# Patient Record
Sex: FEMALE | Race: ASIAN | URBAN - METROPOLITAN AREA
[De-identification: names, ages, dates, MRNs, and addresses within clinical notes are randomized per-mention and may not be internally consistent; named-entity substitution may affect disease eponyms.]

---

## 2017-05-02 ENCOUNTER — HISTORICAL (OUTPATIENT)
Dept: ADMINISTRATIVE | Facility: HOSPITAL | Age: 47
End: 2017-05-02

## 2017-05-02 LAB
CHOLEST SERPL-MCNC: 196 MG/DL
CHOLEST/HDLC SERPL: 3 {RATIO} (ref 0–4.4)
EST. AVERAGE GLUCOSE BLD GHB EST-MCNC: 103 MG/DL
HBA1C MFR BLD: 5.2 % (ref 4.7–5.6)
HDLC SERPL-MCNC: 66 MG/DL
LDLC SERPL CALC-MCNC: 109 MG/DL (ref 0–130)
TRIGL SERPL-MCNC: 103 MG/DL
VLDLC SERPL CALC-MCNC: 21 MG/DL

## 2018-12-17 ENCOUNTER — HISTORICAL (OUTPATIENT)
Dept: ADMINISTRATIVE | Facility: HOSPITAL | Age: 48
End: 2018-12-17

## 2018-12-17 LAB
ABS NEUT (OLG): 4 X10(3)/MCL (ref 2.1–9.2)
ALBUMIN SERPL-MCNC: 3.5 GM/DL (ref 3.4–5)
ALBUMIN/GLOB SERPL: 1 RATIO (ref 1–2)
ALP SERPL-CCNC: 61 UNIT/L (ref 45–117)
ALT SERPL-CCNC: 18 UNIT/L (ref 12–78)
APPEARANCE, UA: CLEAR
AST SERPL-CCNC: 11 UNIT/L (ref 15–37)
BACTERIA #/AREA URNS AUTO: ABNORMAL /[HPF]
BASOPHILS # BLD AUTO: 0.02 X10(3)/MCL
BASOPHILS NFR BLD AUTO: 0 %
BILIRUB SERPL-MCNC: 0.3 MG/DL (ref 0.2–1)
BILIRUB UR QL STRIP: NEGATIVE
BILIRUBIN DIRECT+TOT PNL SERPL-MCNC: <0.1 MG/DL
BILIRUBIN DIRECT+TOT PNL SERPL-MCNC: ABNORMAL MG/DL
BUN SERPL-MCNC: 15 MG/DL (ref 7–18)
CALCIUM SERPL-MCNC: 8.2 MG/DL (ref 8.5–10.1)
CHLORIDE SERPL-SCNC: 108 MMOL/L (ref 98–107)
CHOLEST SERPL-MCNC: 173 MG/DL
CHOLEST/HDLC SERPL: 3.1 {RATIO} (ref 0–4.4)
CO2 SERPL-SCNC: 28 MMOL/L (ref 21–32)
COLOR UR: YELLOW
CREAT SERPL-MCNC: 0.7 MG/DL (ref 0.6–1.3)
EOSINOPHIL # BLD AUTO: 0.2 10*3/UL
EOSINOPHIL NFR BLD AUTO: 3 %
ERYTHROCYTE [DISTWIDTH] IN BLOOD BY AUTOMATED COUNT: 12.4 % (ref 11.5–14.5)
EST. AVERAGE GLUCOSE BLD GHB EST-MCNC: 105 MG/DL
GLOBULIN SER-MCNC: 4 GM/ML (ref 2.3–3.5)
GLUCOSE (UA): NORMAL
GLUCOSE SERPL-MCNC: 82 MG/DL (ref 74–106)
HBA1C MFR BLD: 5.3 % (ref 4.2–6.3)
HCT VFR BLD AUTO: 39.1 % (ref 35–46)
HDLC SERPL-MCNC: 56 MG/DL
HGB BLD-MCNC: 12.7 GM/DL (ref 12–16)
HGB UR QL STRIP: >1 MG/DL
HYALINE CASTS #/AREA URNS LPF: ABNORMAL /[LPF]
IMM GRANULOCYTES # BLD AUTO: 0.01 10*3/UL
IMM GRANULOCYTES NFR BLD AUTO: 0 %
KETONES UR QL STRIP: NEGATIVE
LDLC SERPL CALC-MCNC: 97 MG/DL (ref 0–130)
LEUKOCYTE ESTERASE UR QL STRIP: 75 LEU/UL
LYMPHOCYTES # BLD AUTO: 1.18 X10(3)/MCL
LYMPHOCYTES NFR BLD AUTO: 20 % (ref 13–40)
MCH RBC QN AUTO: 29.7 PG (ref 26–34)
MCHC RBC AUTO-ENTMCNC: 32.5 GM/DL (ref 31–37)
MCV RBC AUTO: 91.4 FL (ref 80–100)
MONOCYTES # BLD AUTO: 0.48 X10(3)/MCL
MONOCYTES NFR BLD AUTO: 8 % (ref 4–12)
NEUTROPHILS # BLD AUTO: 4 X10(3)/MCL
NEUTROPHILS NFR BLD AUTO: 68 X10(3)/MCL
NITRITE UR QL STRIP: NEGATIVE
PH UR STRIP: 6 [PH] (ref 4.5–8)
PLATELET # BLD AUTO: 257 X10(3)/MCL (ref 130–400)
PMV BLD AUTO: 11.2 FL (ref 7.4–10.4)
POTASSIUM SERPL-SCNC: 3.8 MMOL/L (ref 3.5–5.1)
PROT SERPL-MCNC: 7.5 GM/DL (ref 6.4–8.2)
PROT UR QL STRIP: 20 MG/DL
RBC # BLD AUTO: 4.28 X10(6)/MCL (ref 4–5.2)
RBC #/AREA URNS AUTO: >=100 /[HPF]
SODIUM SERPL-SCNC: 142 MMOL/L (ref 136–145)
SP GR UR STRIP: 1.02 (ref 1–1.03)
SQUAMOUS #/AREA URNS LPF: ABNORMAL /[LPF]
TRIGL SERPL-MCNC: 99 MG/DL
UROBILINOGEN UR STRIP-ACNC: NORMAL
VLDLC SERPL CALC-MCNC: 20 MG/DL
WBC # SPEC AUTO: 5.9 X10(3)/MCL (ref 4.5–11)
WBC #/AREA URNS AUTO: ABNORMAL /HPF

## 2019-03-18 ENCOUNTER — HISTORICAL (OUTPATIENT)
Dept: ADMINISTRATIVE | Facility: HOSPITAL | Age: 49
End: 2019-03-18

## 2019-03-18 LAB
HBV SURFACE AG SERPL QL IA: NEGATIVE
HCV AB SERPL QL IA: NONREACTIVE
HIV 1+2 AB+HIV1 P24 AG SERPL QL IA: NONREACTIVE
T PALLIDUM AB SER QL: NONREACTIVE

## 2022-04-10 ENCOUNTER — HISTORICAL (OUTPATIENT)
Dept: ADMINISTRATIVE | Facility: HOSPITAL | Age: 52
End: 2022-04-10

## 2022-04-29 VITALS
BODY MASS INDEX: 21.91 KG/M2 | HEIGHT: 63 IN | DIASTOLIC BLOOD PRESSURE: 83 MMHG | SYSTOLIC BLOOD PRESSURE: 123 MMHG | WEIGHT: 123.69 LBS

## 2022-05-02 NOTE — HISTORICAL [facility] CERNER
This is a historical note converted from [employer]. Formatting and pictures may have been removed.  Please reference [employer] for original formatting and attached multimedia. Chief Complaint  annual  History of Present Illness  Pt is  here for annual gyn exam. Denies hx of abnormal pap. Reports last pap was . LMP 19. States has monthly periods lasting 3 days. Denies abnormal bleeding/discharge. Denies abd/pelvic pain. Pt is sexually active and uses condoms for contraception. She is not interested in alternative methods at this time. Denies hx of STIs but is interested in screening. Followed in med clinic for primary care. Denies any chronic medical conditions.  Review of Systems  Negative except HPI  Physical Exam  Vitals & Measurements  T:?98.5? ?F (Oral)? HR:?88(Peripheral)? RR:?18? BP:?123/83?  HT:?159?cm? WT:?56.1?kg? BMI:?22.19?  General: Alert and oriented, No acute distress.  Breast: No mass, No tenderness, No discharge.  Gastrointestinal: Soft, Non-tender.  Gynecology:  Labia: Bilateral, Majora, Minora, Within normal limits.  Vagina: Within normal limits.  Cervix: No cervical motion tenderness, No lesions.  Uterus: Mobile, Not tender.  Ovaries: Not tender, No mass.  Integumentary: Warm, Dry.  Neurologic: Alert, Oriented.  Psychiatric: Cooperative, Appropriate mood & affect.  Assessment/Plan  1.?Pap smear for cervical cancer screening?Z12.4  Ordered:  Clinic Follow up, *Est. 20 3:00:00 CDT, 1 Year, Order for future visit, Pap smear for cervical cancer screening, [facility]  Pathology Gyn Request, 19 15:25:00 CDT, AP Specimen, Thin Prep Pap Cervical-Auto/man screen, Routine GYN/Pap, Cervical, Thin Prep with HPV Probe, Normal, 19, Previous Pap, 2016, Other, Previous Pregnancy, Cervix Present, Routine, Collected, RT - Routine, Pap sme...  Preventative Health Care Est 40-64 years [id number] PC, Pap smear for cervical cancer screening  Routine screening for STI (sexually transmitted  infection), [facility], 03/18/19 15:25:00 CDT  ?  2.?Routine screening for STI (sexually transmitted infection)?Z11.3  Ordered:  Chlamydia trach and N. gonorrhea PCR, Routine collect, Cervical, Order for future visit, 03/18/19 15:25:00 CDT, Stop date 03/18/19 15:25:00 CDT, Nurse collect, Routine screening for STI (sexually transmitted infection)  Hepatitis B Surface Antigen, Routine collect, 03/18/19 15:25:00 CDT, Blood, Order for future visit, Stop date 03/18/19 15:25:00 CDT, Lab Collect, Routine screening for STI (sexually transmitted infection), 03/18/19 15:25:00 CDT  Hepatitis C Antibody, Routine collect, 03/18/19 15:25:00 CDT, Blood, Order for future visit, Stop date 03/18/19 15:25:00 CDT, Lab Collect, Routine screening for STI (sexually transmitted infection), 03/18/19 15:25:00 CDT  HIV 1 and 2, Now collect, 03/18/19 15:25:00 CDT, Blood, Order for future visit, Stop date 03/18/19 15:25:00 CDT, Lab Collect, Routine screening for STI (sexually transmitted infection), 03/18/19 15:25:00 CDT  Preventative Health Care Est 40-64 years [id number] PC, Pap smear for cervical cancer screening  Routine screening for STI (sexually transmitted infection), [facility], 03/18/19 15:25:00 CDT  Syphilis Antibody (with Reflex RPR), Routine collect, 03/18/19 15:25:00 CDT, Blood, Order for future visit, Stop date 03/18/19 15:25:00 CDT, Lab Collect, Routine screening for STI (sexually transmitted infection), 03/18/19 15:25:00 CDT  Wet Prep Smear, Routine collect, Vaginal, Order for future visit, 03/18/19 15:25:00 CDT, Stop date 03/18/19 15:25:00 CDT, Nurse collect, Routine screening for STI (sexually transmitted infection), 03/18/19 15:25:00 CDT  ?   Problem List/Past Medical History  Ongoing  Allergic rhinitis  Procedure/Surgical History  denies   Medications  Biofreeze 3.5% topical gel, 1 bandar, TOP, Daily, PRN  cetirizine 10 mg oral tablet, 10 mg= 1 tab(s), Oral, Daily, 5 refills  Mobic 7.5 mg oral tablet, 7.5 mg= 1 tab(s),  Oral, Daily  Allergies  No Known Medication Allergies  Social History  Alcohol  Never, 10/07/2015  Employment/School  Unemployed, 05/02/2017  Home/Environment  Lives with Children. Alcohol abuse in household: No. Substance abuse in household: No., 05/02/2017  Nutrition/Health  Regular, 05/02/2017  Sexual  Sexually active: Yes. Sexual orientation: Straight or heterosexual. Gender Identity Identifies as female., 03/18/2019  Substance Abuse  Never, 10/07/2015  Tobacco  Never (less than 100 in lifetime), N/A, 03/18/2019  Never smoker, N/A, 12/17/2018  Family History  Diabetes mellitus type 2: Mother.  Immunizations  Vaccine Date Status   influenza virus vaccine, inactivated 12/17/2018 Given

## 2022-05-02 NOTE — HISTORICAL [facility] CERNER
This is a historical note converted from [employer]. Formatting and pictures may have been removed.  Please reference [employer] for original formatting and attached multimedia. Chief Complaint  f/u, c/o back pain x3 days off/on, needs refills  History of Present Illness  48-year-old  female with no past medical history except for allergic rhinitis, is here for routine follow-up.? Patient was last seen by me in December of last year.? Shes been taking Flonase and Zyrtec which she reports has improved her symptoms.? Patient has son in the room who is translating for her as she does not speaking much.? They have refused a .? Son mentions that they are planning to move to [place] in the summer of next year.? And that they would like to get some lab work done its as its been a very long time.? She denies any complaints except for some low back pain that started about 3 days ago that happens mainly when she is resting.? She denies any trauma, or any falls.  Review of Systems  14 point ROS done, negative except for above.  Physical Exam  Vitals & Measurements  T:?36.6? ?C (Oral)? HR:?73(Peripheral)? RR:?16? BP:?103/67?  HT:?159?cm? HT:?159?cm? WT:?53.3?kg? WT:?53.3?kg? BMI:?21.08?  General_appears own age, in no acute distress  Integumentary_ normal capillary refill, normal texture/turgor/moisture  Eye_PERRLA, no icterus, no conjunctival injection  HENT_normocephalic, atraumatic, normal hearing bilateral  Respiratory_clear to auscultation, no wheezes/rales/rhonchi  Cardiovascular_RRR, no murmurs/rubs/gallops, normal distal pulses  Gastrointestinal_normal bowel sounds, abdomen soft/non-tender  Musculoskeletal_normal ROM, bilateral strength equal  Assessment/Plan  1. Allergic Rhinitis  symptoms well controlled with Flonase and zyrtec  will refill meds  no sinus headaches  ?   2. low back pain  acute, likely work related  biofreeze and mobic  ?  ?   RTC in 4 months  labs today  flu today  mammo ordered   Problem  List/Past Medical History  Ongoing  Allergic rhinitis  Historical  No qualifying data  Procedure/Surgical History  denies   Medications  Biofreeze 3.5% topical gel, 1 bandar, TOP, Daily, PRN  cetirizine 10 mg oral tablet, 10 mg= 1 tab(s), Oral, Daily, 5 refills  Mobic 7.5 mg oral tablet, 7.5 mg= 1 tab(s), Oral, Daily  Allergies  No Known Medication Allergies  Social History  Alcohol  Never, 10/07/2015  Employment/School  Unemployed, 05/02/2017  Home/Environment  Lives with Children. Alcohol abuse in household: No. Substance abuse in household: No., 05/02/2017  Nutrition/Health  Regular, 05/02/2017  Substance Abuse  Never, 10/07/2015  Tobacco  Never smoker, N/A, 12/17/2018  Family History  Family history is unknown  Immunizations  Vaccine Date Status   influenza virus vaccine, inactivated 12/17/2018 Given   Health Maintenance  Health Maintenance  ???Pending?(in the next year)  ??? ??OverDue  ??? ? ? ?Diabetes Screening due??and every?  ??? ? ? ?Influenza Vaccine due??and every?  ??? ? ? ?Cervical Cancer Screening due??11/14/15??and every 3??year(s)  ??? ??Due?  ??? ? ? ?ADL Screening due??12/17/18??and every 1??year(s)  ??? ? ? ?Tetanus Vaccine due??12/17/18??and every 10??year(s)  ??? ??Due In Future?  ??? ? ? ?Alcohol Misuse Screening not due until??12/20/18??and every 1??year(s)  ???Satisfied?(in the past 1 year)  ??? ??Satisfied?  ??? ? ? ?Alcohol Misuse Screening on??12/20/17.??Satisfied by Marty DORADO, Brenden BOLIVAR  ??? ? ? ?Blood Pressure Screening on??12/17/18.??Satisfied by Brooke Harmon LPN  ??? ? ? ?Body Mass Index Check on??12/17/18.??Satisfied by Brooke Harmon LPN  ??? ? ? ?Depression Screening on??12/17/18.??Satisfied by Brooke Harmon LPN  ??? ? ? ?Influenza Vaccine on??12/17/18.??Satisfied by Brooke Harmon LPN  ??? ? ? ?Obesity Screening on??12/17/18.??Satisfied by Brooke Harmon LPN  ??? ? ? ?Smoking Cessation (Coronary Artery Disease)  on??02/07/18.??Satisfied by Franklin HOFFMANN, Sara Ramirez  ?  ?      Patient discussed in clinic with medicine resident  Agree with above assessment and plan